# Patient Record
Sex: MALE | Race: BLACK OR AFRICAN AMERICAN | NOT HISPANIC OR LATINO | Employment: FULL TIME | URBAN - METROPOLITAN AREA
[De-identification: names, ages, dates, MRNs, and addresses within clinical notes are randomized per-mention and may not be internally consistent; named-entity substitution may affect disease eponyms.]

---

## 2021-10-20 ENCOUNTER — LAB (OUTPATIENT)
Dept: LAB | Facility: HOSPITAL | Age: 41
End: 2021-10-20

## 2021-10-20 ENCOUNTER — TRANSCRIBE ORDERS (OUTPATIENT)
Dept: LAB | Facility: HOSPITAL | Age: 41
End: 2021-10-20

## 2021-10-20 DIAGNOSIS — K60.3 ANAL FISTULA: Primary | ICD-10-CM

## 2021-10-20 DIAGNOSIS — K60.3 ANAL FISTULA: ICD-10-CM

## 2021-10-20 LAB
DEPRECATED RDW RBC AUTO: 40.4 FL (ref 37–54)
ERYTHROCYTE [DISTWIDTH] IN BLOOD BY AUTOMATED COUNT: 12.6 % (ref 12.3–15.4)
HCT VFR BLD AUTO: 38.1 % (ref 37.5–51)
HGB BLD-MCNC: 12.4 G/DL (ref 13–17.7)
MCH RBC QN AUTO: 28.6 PG (ref 26.6–33)
MCHC RBC AUTO-ENTMCNC: 32.5 G/DL (ref 31.5–35.7)
MCV RBC AUTO: 87.8 FL (ref 79–97)
PLATELET # BLD AUTO: 229 10*3/MM3 (ref 140–450)
PMV BLD AUTO: 10.5 FL (ref 6–12)
RBC # BLD AUTO: 4.34 10*6/MM3 (ref 4.14–5.8)
WBC # BLD AUTO: 5.84 10*3/MM3 (ref 3.4–10.8)

## 2021-10-20 PROCEDURE — 36415 COLL VENOUS BLD VENIPUNCTURE: CPT

## 2021-10-20 PROCEDURE — 80053 COMPREHEN METABOLIC PANEL: CPT

## 2021-10-20 PROCEDURE — 85027 COMPLETE CBC AUTOMATED: CPT

## 2021-10-21 LAB
ALBUMIN SERPL-MCNC: 3.9 G/DL (ref 3.5–5.2)
ALBUMIN/GLOB SERPL: 1 G/DL
ALP SERPL-CCNC: 85 U/L (ref 39–117)
ALT SERPL W P-5'-P-CCNC: 34 U/L (ref 1–41)
ANION GAP SERPL CALCULATED.3IONS-SCNC: 8.9 MMOL/L (ref 5–15)
AST SERPL-CCNC: 32 U/L (ref 1–40)
BILIRUB SERPL-MCNC: 0.5 MG/DL (ref 0–1.2)
BUN SERPL-MCNC: 14 MG/DL (ref 6–20)
BUN/CREAT SERPL: 12.7 (ref 7–25)
CALCIUM SPEC-SCNC: 9.2 MG/DL (ref 8.6–10.5)
CHLORIDE SERPL-SCNC: 102 MMOL/L (ref 98–107)
CO2 SERPL-SCNC: 28.1 MMOL/L (ref 22–29)
CREAT SERPL-MCNC: 1.1 MG/DL (ref 0.76–1.27)
GFR SERPL CREATININE-BSD FRML MDRD: 90 ML/MIN/1.73
GLOBULIN UR ELPH-MCNC: 4.1 GM/DL
GLUCOSE SERPL-MCNC: 80 MG/DL (ref 65–99)
POTASSIUM SERPL-SCNC: 4 MMOL/L (ref 3.5–5.2)
PROT SERPL-MCNC: 8 G/DL (ref 6–8.5)
SODIUM SERPL-SCNC: 139 MMOL/L (ref 136–145)

## 2021-10-26 ENCOUNTER — HOSPITAL ENCOUNTER (OUTPATIENT)
Dept: CARDIOLOGY | Facility: HOSPITAL | Age: 41
Discharge: HOME | End: 2021-10-26
Admitting: COLON & RECTAL SURGERY

## 2021-10-26 ENCOUNTER — TRANSCRIBE ORDERS (OUTPATIENT)
Dept: CARDIOLOGY | Facility: HOSPITAL | Age: 41
End: 2021-10-26

## 2021-10-26 DIAGNOSIS — I10 HYPERTENSION COMPLICATIONS: ICD-10-CM

## 2021-10-26 DIAGNOSIS — I10 HYPERTENSION COMPLICATIONS: Primary | ICD-10-CM

## 2021-10-26 LAB
QT INTERVAL: 408 MS
QTC INTERVAL: 446 MS

## 2021-10-26 PROCEDURE — 93005 ELECTROCARDIOGRAM TRACING: CPT | Performed by: COLON & RECTAL SURGERY

## 2021-10-26 PROCEDURE — 93010 ELECTROCARDIOGRAM REPORT: CPT | Performed by: INTERNAL MEDICINE

## 2021-10-29 ENCOUNTER — TRANSCRIBE ORDERS (OUTPATIENT)
Dept: PHYSICAL THERAPY | Facility: HOSPITAL | Age: 41
End: 2021-10-29

## 2021-10-29 DIAGNOSIS — L02.31 CUTANEOUS ABSCESS OF BUTTOCK: ICD-10-CM

## 2021-10-29 DIAGNOSIS — S31.839D: Primary | ICD-10-CM

## 2021-11-04 ENCOUNTER — HOSPITAL ENCOUNTER (OUTPATIENT)
Dept: PHYSICAL THERAPY | Facility: HOSPITAL | Age: 41
Setting detail: THERAPIES SERIES
Discharge: HOME | End: 2021-11-04

## 2021-11-04 DIAGNOSIS — K61.0 PERIANAL ABSCESS: Primary | ICD-10-CM

## 2021-11-04 PROCEDURE — 97597 DBRDMT OPN WND 1ST 20 CM/<: CPT

## 2021-11-04 PROCEDURE — 97161 PT EVAL LOW COMPLEX 20 MIN: CPT

## 2021-11-04 NOTE — THERAPY EVALUATION
Outpatient Rehabilitation - Wound/Debridement Initial Eval  [facility]     Patient Name: Emiliano Holliday  : 1980  MRN: [id number]  Today's Date: 2021                  Admit Date: 2021    Visit Dx:    ICD-10-CM ICD-9-CM   1. Perianal abscess  K61.0 566       There is no problem list on file for this patient.       No past medical history on file.     No past surgical history on file.     Patient History     Row Name 21 0845             History    Chief Complaint Ulcer, wound or other skin conditions; Pain  -      Type of Pain Other pain  -      Other Type of Pain L buttock wound  -      Brief Description of Current Complaint Pt with h/o recurrent perianal abscess, now s/p surgical debridement of L buttock by Dr. Ramirez.  -      Previous treatment for THIS PROBLEM Surgery  -      Surgery Date: 10/27/21  -      Patient/Caregiver Goals Heal wound; Relieve pain; Return to work; Return to prior level of function  -      Patient's Rating of General Health Good  -      Occupation/sports/leisure activities Pt is a , currently off work due to present condition, spouse has been assisting with dressing changes.  -      How has patient tried to help current problem? Pt has been washing area with soap/water when showering, keeping dry gauze over wound area secured with biker shorts, changing dressings twice/daily  -              Pain     Pain Location Buttocks  -      Pain at Present 5  -      Difficulties at work? currently unable to work due to wound and need for pain medication  -              Services    Are you currently receiving Home Health services No  -              Daily Activities    Primary Language English  -      Patient is concerned about/has problems with Performing job responsibilities/community activities (work, school,; Performing sports, recreation, and play activities; Walking  -      Barriers to learning None  -      Pt  Participated in POC and Goals Yes  -JM            User Key  (r) = Recorded By, (t) = Taken By, (c) = Cosigned By    Initials Name Provider Type    Nicolasa Schulz, EYAD Physical Therapist                EVALUATION   PT Ortho     Row Name 11/04/21 0845       Subjective Comments    Subjective Comments Pt accompanied by spouse, states he took pain medication prior to tx.  -JM       Subjective Pain    Able to rate subjective pain? yes  -JM    Pre-Treatment Pain Level 5  -JM    Post-Treatment Pain Level 5  -JM    Subjective Pain Comment increased pain during intervention  -JM       Transfers    Sit-Stand [place] (Transfers) independent  -JM    Stand-Sit [place] (Transfers) independent  -JM    Comment (Transfers) LSL for tx  -JM       Gait/Stairs (Locomotion)    [place] Level (Gait) independent  -JM    Deviations/Abnormal Patterns (Gait) antalgic; gait speed decreased; base of support, wide  -          User Key  (r) = Recorded By, (t) = Taken By, (c) = Cosigned By    Initials Name Provider Type    Nicolasa Schulz, PT Physical Therapist               LDA Wound     Row Name 11/04/21 0845             Wound 11/04/21 0845 Left perineum Abcess    Wound - Properties Group Placement Date: 11/04/21  - Placement Time: 0845  - Side: Left  - Location: perineum  - Primary Wound Type: Abcess  -      Dressing Appearance intact; moist drainage  4x4 gauze secured with biker shorts  -      Base moist; pink; red; yellow; slough; subcutaneous; necrotic  -JM      Red (%), Wound Tissue Color 50  -JM      Yellow (%), Wound Tissue Color 50  -JM      Periwound intact; indurated; pustules; warm  lateral aspect with weeping pustules in indurated area  -      Periwound Temperature warm  -      Periwound Skin Turgor firm  -      Edges open; irregular  -JM      Wound Length (cm) 5 cm  -JM      Wound Width (cm) 2.5 cm  -JM      Wound Depth (cm) 1 cm  -      Drainage Characteristics/Odor creamy; tan;  "serosanguineous; other (see comments)  culture swab obtained of tan drainage  -      Drainage Amount moderate  -      Care, Wound cleansed with; wound cleanser; debrided  -      Dressing Care dressing applied; antimicrobial agent applied; foam  HFBt, 4x4 gauze, 6\" optifoam gentle  -      Periwound Care barrier ointment applied; cleansed with pH balanced cleanser; dry periwound area maintained  zguard to edges  -      Retired Wound - Properties Group Date first assessed: 11/04/21  - Time first assessed: 0845 - Side: Left  - Location: perineum  - Primary Wound Type: Abcess  -            User Key  (r) = Recorded By, (t) = Taken By, (c) = Cosigned By    Initials Name Provider Type    Nicolasa Schulz, PT Physical Therapist                  WOUND DEBRIDEMENT  Total area of Debridement: 2cmsq  Debridement Site 1  Location- Site 1: L buttock/perianal abscess  Selective Debridement- Site 1: Wound Surface <20cmsq  Instruments- Site 1: tweezers (cotton swabs, 4x4 gauze)  Excised Tissue Description- Site 1: minimum, slough, other (comment)  Bleeding- Site 1: scant              Therapy Education     Row Name 11/04/21 0845             Therapy Education    Education Details Change dressing daily or PRN for drainage or disruption.  Clean wound area with skintegrity spray, wipe gently with gauze.  Apply zguard or abx ointment to skin around wound, cut HFBt to fit wound area, secure with either 6\" optifoam or ABD/gauze and biker shorts to keep HFB in place.  Recommended increased protein intake for wound healing, pressure relief, and rest to assist with wound healing.  PT will contact MD office re: wound culture taken today.  -MO      Given Symptoms/condition management; Bandaging/dressing change; Pain management  -      Program New  -MO      How Provided Verbal; Demonstration  -MO      Provided to Patient; Other (comment)  spouse  -MO      Level of Understanding Verbalized  -            User Key  (r) = " Recorded By, (t) = Taken By, (c) = Cosigned By    Initials Name Provider Type    Nicolasa Schulz PT Physical Therapist                Recommendation and Plan   PT Assessment/Plan     Row Name 11/04/21 0845          PT Assessment    Functional Limitations Impaired gait; Limitation in home management; Limitations in community activities; Performance in leisure activities; Performance in self-care ADL; Performance in work activities; Performance in sport activities  -     Impairments Gait; Integumentary integrity; Pain  -     Assessment Comments Pt presents with open wound to L buttock/perianal area s/p I&D of chronic abscess.  Wound bed moist with approximately 50% granulation/pink tissue and 50% residual necrotic tissue, periwound tissues indurated with some pustules with tan creamy drainage noted.  Pt will require skilled PT for wound debridement and advanced dressings.  PT initiated use of HFBt to assist with autolytic debridement and provide bacteriostatic environment.  Educated pt and spouse on home dressing changes, nutrition for wound healing, and pressure relief to maximize wound healing potential.  -     Rehab Potential Good  -     Patient/caregiver participated in establishment of treatment plan and goals Yes  -     Patient would benefit from skilled therapy intervention Yes  -            PT Plan    PT Frequency 1x/week  -     Predicted Duration of Therapy Intervention (PT) 12 visits  -     Planned CPT's? PT EVAL LOW COMPLEXITY: 69361; PT MCKENZIE DEBRIDE OPEN WOUND UP TO 20 CM: 69020; PT NONSELECT DEBRIDE 15 MIN: 82710; PT SELF CARE/MGMT/TRAIN 15 MIN: 20097  -     Physical Therapy Interventions (Optional Details) patient/family education; wound care  -     PT Plan Comments debridement, dressings  -           User Key  (r) = Recorded By, (t) = Taken By, (c) = Cosigned By    Initials Name Provider Type    Nicolasa Schulz PT Physical Therapist                  Goals   PT OP Goals      Row Name 11/04/21 0845          PT Short Term Goals    STG Date to Achieve 01/02/22  -     STG 1 Reduce wound dimensions by 25% as evidence of wound healing.  -     STG 1 Progress New  -     STG 2 Pt to report less than 3/10 pain with daily activities to promote return to PLOF.  -     STG 2 Progress New  [facility]            Long Term Goals    LTG Date to Achieve 02/02/22  -     LTG 1 Patient/spouse independent with home dressing changes.  -     LTG 1 Progress New  [facility]     LTG 2 Reduce wound dimensions by 75% as evidence of wound healing.  -     LTG 2 Progress New  [facility]            Time Calculation    PT Goal Re-Cert Due Date 02/02/22  -           User Key  (r) = Recorded By, (t) = Taken By, (c) = Cosigned By    Initials Name Provider Type    Nicolasa Schulz, PT Physical Therapist                Time Calculation: Start Time: 0845  Untimed Charges  PT Eval/Re-eval Minutes: 30  Wound Care: 02953 Selective debridement  36833-Gexsqtdjg debridement: 25  Total Minutes  Untimed Charges Total Minutes: 55   Total Minutes: 55  Therapy Charges for Today     Code Description Service Date Service Provider Modifiers Qty    52301890135 HC MCKENZIE DEBRIDE OPEN WOUND UP TO 20CM 11/4/2021 Nicolasa Corrales, PT GP 1    75712304253 HC PT EVAL LOW COMPLEXITY 2 11/4/2021 Nicolasa Corrales, PT GP 1                Nicolasa Corrales, PT  11/4/2021

## 2021-11-10 ENCOUNTER — HOSPITAL ENCOUNTER (OUTPATIENT)
Dept: PHYSICAL THERAPY | Facility: HOSPITAL | Age: 41
Setting detail: THERAPIES SERIES
Discharge: HOME | End: 2021-11-10

## 2021-11-10 DIAGNOSIS — K61.0 PERIANAL ABSCESS: Primary | ICD-10-CM

## 2021-11-10 PROCEDURE — 97602 WOUND(S) CARE NON-SELECTIVE: CPT

## 2021-11-10 NOTE — THERAPY WOUND CARE TREATMENT
"    Outpatient Rehabilitation - Wound/Debridement Treatment Note  [facility]     Patient Name: Emiliano Holliday  : 1980  MRN: [id number]  Today's Date: 11/10/2021                 Admit Date: 11/10/2021    Visit Dx:    ICD-10-CM ICD-9-CM   1. Perianal abscess  K61.0 566       There is no problem list on file for this patient.       No past medical history on file.     No past surgical history on file.      EVALUATION   PT Ortho     Row Name 11/10/21 0850       Subjective Comments    Subjective Comments Pt took pain medication prior to treatment, reports changing the dressing is the hardest part. He ran out of HFB so he had to use gauze this time, which hurts worse.  -MC       Subjective Pain    Able to rate subjective pain? yes  -MC    Pre-Treatment Pain Level 5  -MC    Post-Treatment Pain Level 5  -MC    Subjective Pain Comment \"normal\" level of pain, increased slightly with debridement  -MC       Transfers    Sit-Stand [place] (Transfers) independent  -MC    Stand-Sit [place] (Transfers) independent  -MC    Comment (Transfers) LSL for tx  -       Gait/Stairs (Locomotion)    [place] Level (Gait) independent  -MC    Deviations/Abnormal Patterns (Gait) antalgic; gait speed decreased; base of support, wide  -          User Key  (r) = Recorded By, (t) = Taken By, (c) = Cosigned By    Initials Name Provider Type    Brenda Hernandez, PT Physical Therapist                 [facility] Wound     Row Name 11/10/21 0850             Wound 21 0845 Left perineum Abcess    Wound - Properties Group Placement Date: 21  - Placement Time: 845 Side: Left  - Location: perineum  - Primary Wound Type: Abcess  -JM      Dressing Appearance intact; moist drainage  gauze only  -      Base moist; pink; red; yellow; subcutaneous  increasing beefy red granulation  -MC      Red (%), Wound Tissue Color 80  -MC      Yellow (%), Wound Tissue Color 20  -MC      Periwound intact; indurated; warm  no " "pustules today, very mild induration laterally  -      Periwound Temperature warm  -      Periwound Skin Turgor firm  -      Edges open; irregular  -      Wound Length (cm) 5 cm  -      Wound Width (cm) 2.2 cm  -      Wound Depth (cm) 0.2 cm  -      Drainage Characteristics/Odor serosanguineous  -      Drainage Amount small  -      Care, Wound cleansed with; wound cleanser; debrided  -      Dressing Care dressing applied; antimicrobial agent applied; foam; gauze; low-adherent; border dressing  HFBt, 4x4 gauze. 6\" optifoam  -      Periwound Care cleansed with pH balanced cleanser; barrier ointment applied  zguard  -      Retired Wound - Properties Group Date first assessed: 11/04/21  - Time first assessed: 0845 - Side: Left  - Location: perineum  - Primary Wound Type: Abcess  -            User Key  (r) = Recorded By, (t) = Taken By, (c) = Cosigned By    Initials Name Provider Type    Brenda Hernandez, PT Physical Therapist    Nicolasa Schulz, PT Physical Therapist                  WOUND DEBRIDEMENT  Total area of Debridement: nonselective  Debridement Site 1  Location- Site 1: L buttock/perianal abscess  Selective Debridement- Site 1: Wound Surface <20cmsq  Instruments- Site 1: other (comment) (gauze)  Excised Tissue Description- Site 1: minimum, slough  Bleeding- Site 1: none              Therapy Education     Row Name 11/10/21 0850             Therapy Education    Education Details Continue current POC, no need for repeat culture at this time  -      Given Symptoms/condition management; Bandaging/dressing change; Pain management  -      Program Reinforced  -      How Provided Verbal; Demonstration  -MC      Provided to Patient; Other (comment)  spouse  -      Level of Understanding Verbalized  -            User Key  (r) = Recorded By, (t) = Taken By, (c) = Cosigned By    Initials Name Provider Type    Brenda Hernandez, PT Physical Therapist          "       Recommendation and Plan   PT Assessment/Plan     Row Name 11/10/21 0850          PT Assessment    Functional Limitations Impaired gait; Limitation in home management; Limitations in community activities; Performance in leisure activities; Performance in self-care ADL; Performance in work activities; Performance in sport activities  wound mgmt  -     Impairments Gait; Integumentary integrity; Pain  -     Assessment Comments Pt with stable superficial wound dimensions, but notable decrease in depth, with increased beefy red granulation and only a small amount of yellow slough/subcutaneous tissue remaining. Periwound is also much improved, with no pustules and only mild induration remaining. Pt is making excellent progress with the current POC.  -     Rehab Potential Good  -     Patient/caregiver participated in establishment of treatment plan and goals Yes  -     Patient would benefit from skilled therapy intervention Yes  -            PT Plan    PT Frequency 1x/week  -     Physical Therapy Interventions (Optional Details) wound care; patient/family education  -     PT Plan Comments debridement, dressings, ?decrease frequency if little/no debridement needed  -           User Key  (r) = Recorded By, (t) = Taken By, (c) = Cosigned By    Initials Name Provider Type    Brenda Hernandez, PT Physical Therapist                Goals   PT OP Goals     Row Name 11/10/21 0850          Time Calculation    PT Goal Re-Cert Due Date 02/02/22  -           User Key  (r) = Recorded By, (t) = Taken By, (c) = Cosigned By    Initials Name Provider Type    Brenda Hernandez, PT Physical Therapist                PT Goal Re-Cert Due Date: 02/02/22            Time Calculation: Start Time: 0850  Untimed Charges  Wound Care: 27537 Non-selective debridement  97602-Non-selective debridement: 20  Total Minutes  Untimed Charges Total Minutes: 20   Total Minutes: 20  Therapy Charges for Today     Code Description  Service Date Service Provider Modifiers Qty    71720324285 HC NONSELECTIVE DEBRIDEMENT 11/10/2021 Brenda Alatorre, PT GP 1                  Brenda Alatorre, PT  11/10/2021

## 2021-11-16 ENCOUNTER — HOSPITAL ENCOUNTER (OUTPATIENT)
Dept: PHYSICAL THERAPY | Facility: HOSPITAL | Age: 41
Setting detail: THERAPIES SERIES
Discharge: HOME | End: 2021-11-16

## 2021-11-16 DIAGNOSIS — K61.0 PERIANAL ABSCESS: Primary | ICD-10-CM

## 2021-11-16 PROCEDURE — 97597 DBRDMT OPN WND 1ST 20 CM/<: CPT

## 2021-11-16 NOTE — THERAPY WOUND CARE TREATMENT
Outpatient Rehabilitation - Wound/Debridement Treatment Note  [facility]     Patient Name: Emiliano Holliday  : 1980  MRN: [id number]  Today's Date: 2021                 Admit Date: 2021    Visit Dx:    ICD-10-CM ICD-9-CM   1. Perianal abscess  K61.0 566       There is no problem list on file for this patient.       No past medical history on file.     No past surgical history on file.      EVALUATION   PT Ortho     Row Name 2145       Subjective Comments    Subjective Comments States he has noticed chafing between legs due to increased time spent lying down. Has noticed new bump on R buttock that MD will examine at next appt.  -MP       Subjective Pain    Able to rate subjective pain? yes  -MP    Pre-Treatment Pain Level 5  -MP    Post-Treatment Pain Level 5  -MP       Transfers    Sit-Stand [place] (Transfers) independent  -MP    Stand-Sit [place] (Transfers) independent  -MP    Comment (Transfers) LSL for tx  -MP       Gait/Stairs (Locomotion)    [place] Level (Gait) independent  -MP    Deviations/Abnormal Patterns (Gait) antalgic; gait speed decreased; base of support, wide  -MP          User Key  (r) = Recorded By, (t) = Taken By, (c) = Cosigned By    Initials Name Provider Type    Antoni Mahan PT Physical Therapist                 [facility] Wound     Row Name 2145             Wound 2145 Left perineum Abcess    Wound - Properties Group Placement Date: 21  - Placement Time: 845  - Side: Left  - Location: perineum  - Primary Wound Type: Abcess  -      Dressing Appearance intact; moist drainage  -MP      Base moist; pink; red; yellow; subcutaneous  increasing beefy red granulation  -MP      Periwound intact; indurated; warm  mild induration laterally  -MP      Periwound Temperature warm  -MP      Periwound Skin Turgor firm  -MP      Edges open; irregular  -MP      Wound Length (cm) 4.7 cm  -MP      Wound Width (cm) 2.4 cm  -MP       Wound Depth (cm) 0.2 cm  -MP      Drainage Characteristics/Odor serosanguineous  -MP      Drainage Amount small  -MP      Care, Wound cleansed with; wound cleanser; debrided  -MP      Dressing Care dressing applied; antimicrobial agent applied; foam; gauze; border dressing  HFBt, 4x4 gauze, 6'' optifoam  -MP      Periwound Care cleansed with pH balanced cleanser; barrier ointment applied; other (see comments)  z guard  -MP      Retired Wound - Properties Group Date first assessed: 11/04/21  - Time first assessed: 0845 - Side: Left  - Location: perineum  - Primary Wound Type: Abcess  -            User Key  (r) = Recorded By, (t) = Taken By, (c) = Cosigned By    Initials Name Provider Type    Nicolasa Schulz, PT Physical Therapist    Antoni Mahan, PT Physical Therapist                  WOUND DEBRIDEMENT  Total area of Debridement: 1 cm2  Debridement Site 1  Location- Site 1: L buttock/perianal abscess  Selective Debridement- Site 1: Wound Surface <20cmsq  Instruments- Site 1: tweezers, other (comment) (gauze)  Excised Tissue Description- Site 1: minimum, slough  Bleeding- Site 1: none              Therapy Education     Row Name 11/16/21 1504             Therapy Education    Education Details PT issued interdry to place between skin folds to assist with moisture management.  -MP      Given Symptoms/condition management; Bandaging/dressing change; Pain management  -MP      Program Reinforced  -MP      How Provided Verbal; Demonstration  -MP      Provided to Patient; Other (comment)  spouse  -MP      Level of Understanding Verbalized  -MP            User Key  (r) = Recorded By, (t) = Taken By, (c) = Cosigned By    Initials Name Provider Type    Antoni Mahan PT Physical Therapist                Recommendation and Plan   PT Assessment/Plan     Row Name 11/16/21 4693          PT Assessment    Functional Limitations Impaired gait; Limitation in home management; Limitations in community activities;  Performance in leisure activities; Performance in self-care ADL; Performance in work activities; Performance in sport activities  wound mgmt  -MP     Impairments Gait; Integumentary integrity; Pain  -MP     Assessment Comments Wound length measured smaller but width measured slightly larger likely due to interrater difference as opposed to actual worsening. PT able to lightly debride minimal slough from wound base revealing moist, healthy red tissue. Mild induration present to lateral wound border. Patient would continue to benefit from skilled PT wound care to promote increased wound healing potential.  -MP     Rehab Potential Good  -MP     Patient/caregiver participated in establishment of treatment plan and goals Yes  -MP     Patient would benefit from skilled therapy intervention Yes  -MP            PT Plan    PT Frequency 1x/week  -MP     Physical Therapy Interventions (Optional Details) patient/family education; wound care  -MP     PT Plan Comments debridement, dressing management, decrease appt frequency pending debridement needs  -MP           User Key  (r) = Recorded By, (t) = Taken By, (c) = Cosigned By    Initials Name Provider Type    Antoni Mahan PT Physical Therapist                Goals   PT OP Goals     Row Name 11/16/21 1015          Time Calculation    PT Goal Re-Cert Due Date 02/02/22  -MP           User Key  (r) = Recorded By, (t) = Taken By, (c) = Cosigned By    Initials Name Provider Type    Antoni Mahan PT Physical Therapist                PT Goal Re-Cert Due Date: 02/02/22            Time Calculation: Start Time: 0945  Untimed Charges  46227-Uvxarpxmd debridement: 20  Total Minutes  Untimed Charges Total Minutes: 20   Total Minutes: 20  Therapy Charges for Today     Code Description Service Date Service Provider Modifiers Qty    [id number]  MCKENZIE DEBRIDE OPEN WOUND UP TO 20CM 11/16/2021 Antoni Varela PT GP 1                  Antoni Varela PT  11/16/2021

## 2021-11-30 ENCOUNTER — HOSPITAL ENCOUNTER (OUTPATIENT)
Dept: PHYSICAL THERAPY | Facility: HOSPITAL | Age: 41
Setting detail: THERAPIES SERIES
Discharge: HOME | End: 2021-11-30

## 2021-11-30 DIAGNOSIS — K61.0 PERIANAL ABSCESS: Primary | ICD-10-CM

## 2021-11-30 PROCEDURE — 97597 DBRDMT OPN WND 1ST 20 CM/<: CPT

## 2021-12-09 ENCOUNTER — HOSPITAL ENCOUNTER (OUTPATIENT)
Dept: PHYSICAL THERAPY | Facility: HOSPITAL | Age: 41
Setting detail: THERAPIES SERIES
Discharge: HOME | End: 2021-12-09

## 2021-12-09 DIAGNOSIS — K61.0 PERIANAL ABSCESS: Primary | ICD-10-CM

## 2021-12-09 PROCEDURE — 97597 DBRDMT OPN WND 1ST 20 CM/<: CPT

## 2021-12-09 NOTE — THERAPY PROGRESS REPORT/RE-CERT
Outpatient Rehabilitation - Wound/Debridement Progress Note  [facility]     Patient Name: Emiliano Holliday  : 1980  MRN: [id number]  Today's Date: 2021                  Admit Date: 2021    Visit Dx:    ICD-10-CM ICD-9-CM   1. Perianal abscess  K61.0 566       There is no problem list on file for this patient.       No past medical history on file.     No past surgical history on file.      EVALUATION   PT Ortho     Row Name 21       Subjective Comments    Subjective Comments States chafing is cont to improve. States wound itself rarely hurts but distal area is painful.  -MP       Subjective Pain    Able to rate subjective pain? yes  -MP    Pre-Treatment Pain Level 2  -MP    Post-Treatment Pain Level 2  -MP       Transfers    Sit-Stand [place] (Transfers) independent  -MP    Stand-Sit [place] (Transfers) independent  -MP    Comment (Transfers) LSL for tx  -MP       Gait/Stairs (Locomotion)    [place] Level (Gait) independent  -MP    Deviations/Abnormal Patterns (Gait) antalgic; gait speed decreased; base of support, wide  -MP          User Key  (r) = Recorded By, (t) = Taken By, (c) = Cosigned By    Initials Name Provider Type    MP Antoni Varela, PT Physical Therapist                 [facility] Wound     Row Name 2135             Wound 21 0845 Left perineum Abcess    Wound - Properties Group Placement Date: 21  - Placement Time: 845 Side: Left  - Location: perineum  - Primary Wound Type: Abcess  -      Wound Image  View All Images View Images  -MP      Dressing Appearance intact; moist drainage  -MP      Base moist; pink; red; yellow; epithelialization  granulated, additional reepithelialization  -MP      Periwound intact; indurated; warm; pustules  min induration, small pustules of indurated area  -MP      Periwound Temperature warm  -MP      Periwound Skin Turgor firm  -MP      Edges open; irregular  -MP      Wound Length (cm) 3.5 cm  -MP       Wound Width (cm) 1 cm  -MP      Wound Depth (cm) 0.2 cm  -MP      Drainage Characteristics/Odor serosanguineous; yellow; creamy; other (see comments)  yellow exudate expressed from R buttock with gentle overpressure  -MP      Drainage Amount small  -MP      Care, Wound cleansed with; wound cleanser; debrided  -MP      Dressing Care dressing applied; antimicrobial agent applied; gauze  HFBt, 4x4 gauze, primafix tape  -MP      Periwound Care cleansed with pH balanced cleanser; barrier ointment applied; other (see comments)  z guard/antifungal  -MP      Retired Wound - Properties Group Date first assessed: 11/04/21  - Time first assessed: 0845 - Side: Left  - Location: perineum  - Primary Wound Type: Abcess  -            User Key  (r) = Recorded By, (t) = Taken By, (c) = Cosigned By    Initials Name Provider Type    Nicolasa Schulz, PT Physical Therapist    Antoni Mahan, PT Physical Therapist                  WOUND DEBRIDEMENT  Total area of Debridement: 1 cm2  Debridement Site 1  Location- Site 1: L buttock/perianal abscess  Selective Debridement- Site 1: Wound Surface <20cmsq  Instruments- Site 1: tweezers, other (comment) (gauze)  Excised Tissue Description- Site 1: minimum, slough  Bleeding- Site 1: none              Therapy Education     Row Name 12/09/21 0835             Therapy Education    Given Symptoms/condition management; Bandaging/dressing change; Pain management  -MP      Program Reinforced  -MP      How Provided Verbal; Demonstration  -MP      Provided to Patient; Other (comment)  spouse  -MP      Level of Understanding Verbalized  -MP            User Key  (r) = Recorded By, (t) = Taken By, (c) = Cosigned By    Initials Name Provider Type    Antoni Mahan, PT Physical Therapist                Recommendation and Plan   PT Assessment/Plan     Row Name 12/09/21 0935          PT Assessment    Functional Limitations Impaired gait; Limitation in home management; Limitations in  community activities; Performance in leisure activities; Performance in self-care ADL; Performance in work activities; Performance in sport activities  wound mgmt  -MP     Impairments Gait; Integumentary integrity; Pain  -MP     Assessment Comments L buttock wound with notable decrease in measurement with new epithelial tissue at wound periphery. One small pustule noted to proximal periwound skin with small open pinpoint area to R buttock that PT was able to express yellow exudate from with light overpressure. PT progress note completed this date with pt meeting two goals and making progress towards meeting remainder of wound care goals. Patient would continue to benefit from skilled PT wound care to promote increased wound healing potential.  -MP     Rehab Potential Good  -MP     Patient/caregiver participated in establishment of treatment plan and goals Yes  -MP     Patient would benefit from skilled therapy intervention Yes  -MP            PT Plan    PT Frequency 1x/week  -MP     Predicted Duration of Therapy Intervention (PT) 10 visits  -MP     Planned CPT's? PT SELF CARE/MGMT/TRAIN 15 MIN: 64434; PT NONSELECT DEBRIDE 15 MIN: 03290; PT MCKENZIE DEBRIDE OPEN WOUND UP TO 20 CM: 50572; PT NLFU MIST: [id number]  -MP     Physical Therapy Interventions (Optional Details) patient/family education; wound care  -MP     PT Plan Comments debridement, dressing management  -MP           User Key  (r) = Recorded By, (t) = Taken By, (c) = Cosigned By    Initials Name Provider Type    Antoni Mahan, PT Physical Therapist                Goals   PT OP Goals     Row Name 12/09/21 1008 12/09/21 0835       PT Short Term Goals    STG Date to Achieve -- 01/02/22  -MP    STG 1 -- Reduce wound dimensions by 25% as evidence of wound healing.  -MP    STG 1 Progress -- Met  -MP    STG 2 -- Pt to report less than 3/10 pain with daily activities to promote return to PLOF.  -MP    STG 2 Progress -- Ongoing  -MP       Long Term Goals    LTG Date to  Achieve -- 02/02/22  -    LTG 1 -- Patient/spouse independent with home dressing changes.  -MP    LTG 1 Progress -- Met  -    LTG 2 -- Reduce wound dimensions by 75% as evidence of wound healing.  -    LTG 2 Progress -- Ongoing  -       Time Calculation    PT Goal Re-Cert Due Date 02/02/22  -MP 02/02/22  -MP          User Key  (r) = Recorded By, (t) = Taken By, (c) = Cosigned By    Initials Name Provider Type    Antoni Mahan, PT Physical Therapist                PT Goal Re-Cert Due Date: 02/02/22  PT Short Term Goals  STG Date to Achieve: 01/02/22  STG 1: Reduce wound dimensions by 25% as evidence of wound healing.  STG 1 Progress: Met  STG 2: Pt to report less than 3/10 pain with daily activities to promote return to PLOF.  STG 2 Progress: Ongoing  Long Term Goals  LTG Date to Achieve: 02/02/22  LTG 1: Patient/spouse independent with home dressing changes.  LTG 1 Progress: Met  LTG 2: Reduce wound dimensions by 75% as evidence of wound healing.  LTG 2 Progress: Ongoing      Time Calculation: Start Time: 0935  Untimed Charges  86421-Nzhhjoxcy debridement: 20  Total Minutes  Untimed Charges Total Minutes: 20   Total Minutes: 20  Therapy Charges for Today     Code Description Service Date Service Provider Modifiers Qty    [id number] HC MCKENZIE DEBRIDE OPEN WOUND UP TO 20CM 12/9/2021 Antoni Varela, PT GP 1                  Antoni Varela PT  12/9/2021

## 2021-12-15 ENCOUNTER — HOSPITAL ENCOUNTER (OUTPATIENT)
Dept: PHYSICAL THERAPY | Facility: HOSPITAL | Age: 41
Setting detail: THERAPIES SERIES
Discharge: HOME | End: 2021-12-15

## 2021-12-15 DIAGNOSIS — K61.0 PERIANAL ABSCESS: Primary | ICD-10-CM

## 2021-12-15 PROCEDURE — 97597 DBRDMT OPN WND 1ST 20 CM/<: CPT

## 2021-12-15 NOTE — THERAPY WOUND CARE TREATMENT
"    Outpatient Rehabilitation - Wound/Debridement Treatment Note  [facility]     Patient Name: Emiliano Holliday  : 1980  MRN: [id number]  Today's Date: 12/15/2021                 Admit Date: 12/15/2021    Visit Dx:    ICD-10-CM ICD-9-CM   1. Perianal abscess  K61.0 566       There is no problem list on file for this patient.       No past medical history on file.     No past surgical history on file.      EVALUATION   PT Ortho     Row Name 12/15/21 0945       Subjective Comments    Subjective Comments States he has noticed more pain in \"tailbone\" and drainage from wound base. Worried that he may have to have additional surgery to area. Will follow up with dermatology in January.  -MP       Subjective Pain    Able to rate subjective pain? yes  -MP    Pre-Treatment Pain Level 3  -MP    Post-Treatment Pain Level 3  -MP       Transfers    Sit-Stand [place] (Transfers) independent  -MP    Stand-Sit [place] (Transfers) independent  -MP    Comment (Transfers) LSL for tx  -MP       Gait/Stairs (Locomotion)    [place] Level (Gait) independent  -MP    Deviations/Abnormal Patterns (Gait) antalgic; gait speed decreased; base of support, wide  -MP          User Key  (r) = Recorded By, (t) = Taken By, (c) = Cosigned By    Initials Name Provider Type    Antoni Mahan PT Physical Therapist                 [facility] Wound     Row Name 12/15/21 0945             Wound 21 0845 Left perineum Abcess    Wound - Properties Group Placement Date: 21  - Placement Time: 845  - Side: Left  - Location: perineum  - Primary Wound Type: Abcess  -JM      Dressing Appearance intact; moist drainage  -MP      Base moist; pink; red; yellow; epithelialization  granulated, additional reepithelialization  -MP      Periwound intact; indurated; warm; pustules  min induration, small pustules of indurated area  -MP      Periwound Temperature warm  -MP      Periwound Skin Turgor firm  -MP      Edges open; irregular  " -MP      Drainage Characteristics/Odor serosanguineous; yellow; creamy; other (see comments)  -MP      Drainage Amount small  -MP      Care, Wound cleansed with; wound cleanser; debrided  -MP      Dressing Care dressing applied; antimicrobial agent applied; foam; gauze  HFBt, 4x4 primafix tape  -MP      Periwound Care cleansed with pH balanced cleanser; barrier ointment applied; other (see comments)  z guard/antifungal  -MP      Retired Wound - Properties Group Date first assessed: 11/04/21  - Time first assessed: 0845 - Side: Left  - Location: perineum  - Primary Wound Type: Abcess  -            User Key  (r) = Recorded By, (t) = Taken By, (c) = Cosigned By    Initials Name Provider Type    Nicolasa Schulz, PT Physical Therapist    Antoni Mahan, PT Physical Therapist                  WOUND DEBRIDEMENT  Total area of Debridement: 1 cm2  Debridement Site 1  Location- Site 1: L buttock/perianal abscess  Selective Debridement- Site 1: Wound Surface <20cmsq  Instruments- Site 1: tweezers, other (comment) (gauze)  Excised Tissue Description- Site 1: minimum, slough  Bleeding- Site 1: none              Therapy Education     Row Name 12/15/21 5441             Therapy Education    Education Details Encouraged pt to follow up with surgeon if wound pain and drainage continues to worsen.  -MP      Given Symptoms/condition management; Bandaging/dressing change; Pain management  -MP      Program Reinforced  -MP      How Provided Verbal; Demonstration  -MP      Provided to Patient; Other (comment)  spouse  -MP      Level of Understanding Verbalized  -MP            User Key  (r) = Recorded By, (t) = Taken By, (c) = Cosigned By    Initials Name Provider Type    Antoni Mahan, PT Physical Therapist                Recommendation and Plan   PT Assessment/Plan     Row Name 12/15/21 2379          PT Assessment    Functional Limitations Impaired gait; Limitation in home management; Limitations in community  activities; Performance in leisure activities; Performance in self-care ADL; Performance in work activities; Performance in sport activities  wound mgmt  -MP     Impairments Gait; Integumentary integrity; Pain  -MP     Assessment Comments PT able to lightly debride slough from wound base revealing moist pink wound base with epithelial tissue noted to wound periphery. Periwound induration and pustules remain stable but pt reports increased pain and drainage from area. Pt to see a dermatologist in January but PT instructed pt to follow up with surgeon if symptoms worsen. Patient would continue to benefit from skilled PT wound care to promote increased wound healing potential.  -MP            PT Plan    PT Frequency 1x/week  -MP     Physical Therapy Interventions (Optional Details) patient/family education; wound care  -MP     PT Plan Comments debridement, dressing management  -MP           User Key  (r) = Recorded By, (t) = Taken By, (c) = Cosigned By    Initials Name Provider Type    Antoni Mahan PT Physical Therapist                Goals   PT OP Goals     Row Name 12/15/21 1041          Time Calculation    PT Goal Re-Cert Due Date 02/02/22  -MP           User Key  (r) = Recorded By, (t) = Taken By, (c) = Cosigned By    Initials Name Provider Type    Antoni Mahan, PT Physical Therapist                PT Goal Re-Cert Due Date: 02/02/22            Time Calculation: Start Time: 0945  Untimed Charges  43754-Ymaboufhl debridement: 20  Total Minutes  Untimed Charges Total Minutes: 20   Total Minutes: 20  Therapy Charges for Today     Code Description Service Date Service Provider Modifiers Qty    [id number] HC MCKENZIE DEBRIDE OPEN WOUND UP TO 20CM 12/15/2021 Antoni Varela, PT GP 1                  Antoni Varela PT  12/15/2021

## 2021-12-21 ENCOUNTER — HOSPITAL ENCOUNTER (OUTPATIENT)
Dept: PHYSICAL THERAPY | Facility: HOSPITAL | Age: 41
Setting detail: THERAPIES SERIES
Discharge: HOME | End: 2021-12-21

## 2021-12-21 DIAGNOSIS — K61.0 PERIANAL ABSCESS: Primary | ICD-10-CM

## 2021-12-21 PROCEDURE — 97597 DBRDMT OPN WND 1ST 20 CM/<: CPT

## 2021-12-21 NOTE — THERAPY WOUND CARE TREATMENT
"    Outpatient Rehabilitation - Wound/Debridement Treatment Note  [facility]     Patient Name: Emiliano Holliday  : 1980  MRN: [id number]  Today's Date: 2021                 Admit Date: 2021    Visit Dx:    ICD-10-CM ICD-9-CM   1. Perianal abscess  K61.0 566       There is no problem list on file for this patient.       No past medical history on file.     No past surgical history on file.      EVALUATION   PT Ortho     Row Name 21 08       Subjective Comments    Subjective Comments Pt reports mostly discomfort now instead of pain. The proximal/lateral area still has a pinpoint \"hole\" that drains.  -       Subjective Pain    Able to rate subjective pain? yes  -MC    Pre-Treatment Pain Level 0  -MC    Post-Treatment Pain Level 0  -MC       Transfers    Sit-Stand [place] (Transfers) independent  -MC    Stand-Sit [place] (Transfers) independent  -MC    Comment (Transfers) LSL for tx  -       Gait/Stairs (Locomotion)    [place] Level (Gait) independent  -          User Key  (r) = Recorded By, (t) = Taken By, (c) = Cosigned By    Initials Name Provider Type     Brenda Alatorre, PT Physical Therapist                 [facility] Wound     Row Name 21             Wound 21 0845 Left perineum Abcess    Wound - Properties Group Placement Date: 21  - Placement Time: 845 Side: Left  - Location: perineum  - Primary Wound Type: Abcess  -      Wound Image  View All Images View Images  -      Dressing Appearance intact; moist drainage  -      Base moist; pink; red; epithelialization  one small area just anterior to rectum remains open  -      Periwound intact; indurated; warm; pustules  min induration, pinpoint area near induration that is draining  -      Periwound Temperature warm  -      Periwound Skin Turgor firm  -      Edges open; irregular  -      Wound Length (cm) 1.9 cm  -      Wound Width (cm) 1 cm  with manual separation of " edges  -      Wound Depth (cm) 0.5 cm  -      Drainage Characteristics/Odor serosanguineous; yellow; creamy; other (see comments)  creamy from proximal/lateral pinpoint area only  -      Drainage Amount small  -      Care, Wound cleansed with; wound cleanser; debrided  -      Dressing Care dressing applied; silver impregnated; collagen; other (see comments)  kyle only to perirectal area, gauze/primafix to gluteal area  -      Periwound Care cleansed with pH balanced cleanser; dry periwound area maintained  -      Retired Wound - Properties Group Date first assessed: 11/04/21  - Time first assessed: 0845 - Side: Left  - Location: perineum  - Primary Wound Type: Abcess  -            User Key  (r) = Recorded By, (t) = Taken By, (c) = Cosigned By    Initials Name Provider Type    Brenda Hernandez, PT Physical Therapist    Nicolasa Schulz, PT Physical Therapist                  WOUND DEBRIDEMENT  Total area of Debridement: 1 cm2  Debridement Site 1  Location- Site 1: L buttock/perianal abscess  Selective Debridement- Site 1: Wound Surface <20cmsq  Instruments- Site 1: tweezers  Excised Tissue Description- Site 1: minimum, slough  Bleeding- Site 1: none              Therapy Education     Row Name 12/21/21 0800             Therapy Education    Education Details Follow up with surgeon if proximal/gluteal area worsens or s/sx of infection return. Use kyle only to pack the remaining perirectal area - it should stay in place without cover dressing.  -MC      Given Symptoms/condition management; Bandaging/dressing change; Pain management  -      Program Reinforced  -MC      How Provided Verbal; Demonstration  -MC      Provided to Patient  -MC      Level of Understanding Verbalized  -            User Key  (r) = Recorded By, (t) = Taken By, (c) = Cosigned By    Initials Name Provider Type    Brenda Hernandez, PT Physical Therapist                Recommendation and Plan   PT  "Assessment/Plan     Row Name 12/21/21 0800          PT Assessment    Functional Limitations Impaired gait; Limitation in home management; Limitations in community activities; Performance in leisure activities; Performance in self-care ADL; Performance in work activities; Performance in sport activities  wound mgmt  -     Impairments Gait; Integumentary integrity; Pain  -     Assessment Comments The primary wound area is mostly epithelialized today, with a small \"fold\" of open area remaining just anterior to the rectum. PT able to pack this area with kyle and, given the approximation of the wound walls, no cover dressing is needed. Of note, pt still with mixed creamy and serosanguinous drainage to the proximal gluteal pinpoint area, with nearby induration. This is concerning for additional abscessed tissue. If drainage continues or worsens, pt is agreeable to follow up with his surgeon sooner than planned. Pt will continue to benefit from skilled PT wound care to promote healing.  -     Rehab Potential Good  -     Patient/caregiver participated in establishment of treatment plan and goals Yes  -     Patient would benefit from skilled therapy intervention Yes  -            PT Plan    PT Frequency 1x/week  -     Physical Therapy Interventions (Optional Details) wound care; patient/family education  -     PT Plan Comments debridement, dressings management  -           User Key  (r) = Recorded By, (t) = Taken By, (c) = Cosigned By    Initials Name Provider Type    Brenda Hernandez, PT Physical Therapist                Goals   PT OP Goals     Row Name 12/21/21 0800          Time Calculation    PT Goal Re-Cert Due Date 02/02/22  -           User Key  (r) = Recorded By, (t) = Taken By, (c) = Cosigned By    Initials Name Provider Type    Brenda Hernandez PT Physical Therapist                PT Goal Re-Cert Due Date: 02/02/22            Time Calculation: Start Time: 0800  Untimed " Charges  62713-Qnnvqxdbz debridement: 15  Total Minutes  Untimed Charges Total Minutes: 15   Total Minutes: 15  Therapy Charges for Today     Code Description Service Date Service Provider Modifiers Qty    [id number]  MCKENZIE DEBRIDE OPEN WOUND UP TO 20CM 12/21/2021 Brenda Alatorre, PT GP 1                  Brenda Alatorre, PT  12/21/2021

## 2021-12-29 ENCOUNTER — HOSPITAL ENCOUNTER (OUTPATIENT)
Dept: PHYSICAL THERAPY | Facility: HOSPITAL | Age: 41
Setting detail: THERAPIES SERIES
Discharge: HOME | End: 2021-12-29

## 2021-12-29 DIAGNOSIS — K61.0 PERIANAL ABSCESS: Primary | ICD-10-CM

## 2021-12-29 PROCEDURE — 97597 DBRDMT OPN WND 1ST 20 CM/<: CPT

## 2021-12-29 NOTE — THERAPY WOUND CARE TREATMENT
"    Outpatient Rehabilitation - Wound/Debridement Treatment Note  [facility]     Patient Name: Emiliano Holliday  : 1980  MRN: [id number]  Today's Date: 2021                 Admit Date: 2021    Visit Dx:    ICD-10-CM ICD-9-CM   1. Perianal abscess  K61.0 566       There is no problem list on file for this patient.       No past medical history on file.     No past surgical history on file.      EVALUATION   PT Ortho     Row Name 21       Subjective Comments    Subjective Comments States he sees a dermatologist at end of January. Home dressing management is still going okay.  -MP       Subjective Pain    Able to rate subjective pain? yes  -MP    Pre-Treatment Pain Level 0  -MP    Post-Treatment Pain Level 0  -MP    Subjective Pain Comment just \"annoying\"  -MP       Transfers    Sit-Stand [place] (Transfers) independent  -MP    Stand-Sit [place] (Transfers) independent  -MP    Comment (Transfers) LSL for tx  -MP       Gait/Stairs (Locomotion)    [place] Level (Gait) independent  -MP          User Key  (r) = Recorded By, (t) = Taken By, (c) = Cosigned By    Initials Name Provider Type    Antoni Mahan, PT Physical Therapist                 [facility] Wound     Row Name 2140             Wound 21 0845 Left perineum Abcess    Wound - Properties Group Placement Date: 21  - Placement Time: 845 Side: Left  - Location: perineum  - Primary Wound Type: Abcess  -JM      Dressing Appearance intact; moist drainage  -MP      Base moist; pink; red; epithelialization  one small area just anterior to rectum remains open  -MP      Periwound intact; indurated; warm; pustules  min induration, pinpoint area near induration that is draining  -MP      Periwound Temperature warm  -MP      Periwound Skin Turgor firm  -MP      Edges open; irregular  -MP      Drainage Characteristics/Odor serosanguineous; yellow; creamy; other (see comments)  creamy from proximal/lateral " pinpoint area only  -MP      Drainage Amount small  -MP      Care, Wound cleansed with; wound cleanser; debrided  -MP      Dressing Care dressing applied; collagen; gauze  kyle to perirectal area, gauze/primafix tape to gluteal pustule  -MP      Periwound Care cleansed with pH balanced cleanser; dry periwound area maintained  -MP      Retired Wound - Properties Group Date first assessed: 11/04/21  - Time first assessed: 0845 - Side: Left  - Location: perineum  - Primary Wound Type: Abcess  -            User Key  (r) = Recorded By, (t) = Taken By, (c) = Cosigned By    Initials Name Provider Type    Nicolasa Schulz, PT Physical Therapist    Antoni Mahan, PT Physical Therapist                  WOUND DEBRIDEMENT  Total area of Debridement: 1 cm2  Debridement Site 1  Location- Site 1: L buttock/perianal abscess  Selective Debridement- Site 1: Wound Surface <20cmsq  Instruments- Site 1: tweezers  Excised Tissue Description- Site 1: minimum, slough  Bleeding- Site 1: none              Therapy Education     Row Name 12/29/21 0975             Therapy Education    Education Details Continue current POC. PT reinforced need to follow up with surgeon if gluteal wound worsens, or if s/sx of infection present.  -MP      Given Symptoms/condition management; Bandaging/dressing change; Pain management  -MP      Program Reinforced  -MP      How Provided Verbal; Demonstration  -MP      Provided to Patient  -MP      Level of Understanding Verbalized  -MP            User Key  (r) = Recorded By, (t) = Taken By, (c) = Cosigned By    Initials Name Provider Type    Antoni Mahan, PT Physical Therapist                Recommendation and Plan   PT Assessment/Plan     Row Name 12/29/21 0927          PT Assessment    Functional Limitations Impaired gait; Limitation in home management; Limitations in community activities; Performance in leisure activities; Performance in self-care ADL; Performance in work activities;  Performance in sport activities  wound mgmt  -MP     Impairments Gait; Integumentary integrity; Pain  -MP     Assessment Comments Primary wound continues to reepithelialize with pink, fragile skin with only small moist wound base remaining. Proximal gluteal pinpoint open area remains stable with creamy drainage and nearby induration present. PT reinforced need for follow up with surgeon if status worsens. Patient would continue to benefit from skilled PT wound care to promote increased wound healing potential.  -MP     Rehab Potential Good  -MP     Patient/caregiver participated in establishment of treatment plan and goals Yes  -MP     Patient would benefit from skilled therapy intervention Yes  -MP            PT Plan    PT Frequency 1x/week  -MP     Physical Therapy Interventions (Optional Details) patient/family education; wound care  -MP     PT Plan Comments debridement, dressing management  -MP           User Key  (r) = Recorded By, (t) = Taken By, (c) = Cosigned By    Initials Name Provider Type    Antoni Mahan PT Physical Therapist                Goals   PT OP Goals     Row Name 12/29/21 1006          Time Calculation    PT Goal Re-Cert Due Date 02/02/22  -MP           User Key  (r) = Recorded By, (t) = Taken By, (c) = Cosigned By    Initials Name Provider Type    Antoni Mahan PT Physical Therapist                PT Goal Re-Cert Due Date: 02/02/22            Time Calculation: Start Time: 0940  Untimed Charges  94796-Lepeyqusb debridement: 10  Total Minutes  Untimed Charges Total Minutes: 10   Total Minutes: 10  Therapy Charges for Today     Code Description Service Date Service Provider Modifiers Qty    [id number] HC MCKENZIE DEBRIDE OPEN WOUND UP TO 20CM 12/29/2021 Antoni Varela, PT GP 1                  Antoni Varela PT  12/29/2021

## 2022-01-05 ENCOUNTER — HOSPITAL ENCOUNTER (OUTPATIENT)
Dept: PHYSICAL THERAPY | Facility: HOSPITAL | Age: 42
Setting detail: THERAPIES SERIES
Discharge: HOME | End: 2022-01-05

## 2022-01-05 DIAGNOSIS — K61.0 PERIANAL ABSCESS: Primary | ICD-10-CM

## 2022-01-05 PROCEDURE — 97597 DBRDMT OPN WND 1ST 20 CM/<: CPT

## 2022-01-05 NOTE — THERAPY PROGRESS REPORT/RE-CERT
Outpatient Rehabilitation - Wound/Debridement Progress Note  [facility]     Patient Name: Emiliano Holliday  : 1980  MRN: [id number]  Today's Date: 2022                 Admit Date: 2022    Visit Dx:    ICD-10-CM ICD-9-CM   1. Perianal abscess  K61.0 566       There is no problem list on file for this patient.       No past medical history on file.     No past surgical history on file.      EVALUATION   PT Ortho     Row Name 22 08       Subjective Comments    Subjective Comments No changes, reports he is calling and trying to get into the dermatologist earlier than late this month. Still draining from the gluteal pinpoint area  -       Subjective Pain    Able to rate subjective pain? yes  -MC    Pre-Treatment Pain Level 0  -MC    Post-Treatment Pain Level 0  -MC       Transfers    Sit-Stand [place] (Transfers) independent  -MC    Stand-Sit [place] (Transfers) independent  -MC    Comment (Transfers) LSL for tx  -MC       Gait/Stairs (Locomotion)    [place] Level (Gait) independent  -MC          User Key  (r) = Recorded By, (t) = Taken By, (c) = Cosigned By    Initials Name Provider Type    Brenda Hernandez, PT Physical Therapist                 [facility] Wound     Row Name 22             Wound 21 0845 Left perineum Abcess    Wound - Properties Group Placement Date: 21  - Placement Time: 845 Side: Left  - Location: perineum  - Primary Wound Type: Abcess  -      Dressing Appearance intact; moist drainage  -      Base moist; pink; red; epithelialization  small area just anterior to rectum remains open  -      Periwound intact; indurated; warm; pustules  min induration, pinpoint area near induration that is draining  -      Periwound Temperature warm  -      Periwound Skin Turgor firm  -      Edges open; irregular  -      Wound Length (cm) 1.9 cm  -MC      Wound Width (cm) 1 cm  -      Drainage Characteristics/Odor  "serosanguineous; yellow; creamy; other (see comments)  creamy from proximal/lateral pinpoint area only  -      Drainage Amount small  -      Care, Wound cleansed with; wound cleanser; debrided  -      Dressing Care dressing applied; silver impregnated; collagen; other (see comments)  kyle to perirectal area, 4\" optifoam/primafix gluteal  -      Periwound Care cleansed with pH balanced cleanser; dry periwound area maintained  -      Retired Wound - Properties Group Date first assessed: 11/04/21  - Time first assessed: 0845 - Side: Left  - Location: perineum  - Primary Wound Type: Abcess  -            User Key  (r) = Recorded By, (t) = Taken By, (c) = Cosigned By    Initials Name Provider Type    Brenda Hernandez, PT Physical Therapist    Nicolasa Schulz, PT Physical Therapist                  WOUND DEBRIDEMENT  Total area of Debridement: 1 cm2  Debridement Site 1  Location- Site 1: L buttock/perianal abscess  Selective Debridement- Site 1: Wound Surface <20cmsq  Instruments- Site 1: tweezers  Excised Tissue Description- Site 1: minimum, slough  Bleeding- Site 1: none              Therapy Education     Row Name 01/05/22 0800             Therapy Education    Education Details Continue with current POC. Keep calling to try to get in with dermatologist earlier if possible. Consider follow-up with Dr. Ramirez for buttock abscess.  -MC      Given Symptoms/condition management; Bandaging/dressing change; Pain management  -MC      Program Reinforced  -MC      How Provided Verbal; Demonstration  -MC      Provided to Patient  -MC      Level of Understanding Verbalized  -            User Key  (r) = Recorded By, (t) = Taken By, (c) = Cosigned By    Initials Name Provider Type    Brenda Hernandez, PT Physical Therapist                Recommendation and Plan   PT Assessment/Plan     Row Name 01/05/22 0800          PT Assessment    Functional Limitations Impaired gait; Limitation in home " management; Limitations in community activities; Performance in leisure activities; Performance in self-care ADL; Performance in work activities; Performance in sport activities  wound mgmt  -     Impairments Gait; Integumentary integrity; Pain  -     Assessment Comments Pt has met all his STGs for PT wound care and is progressing toward his final remaining goal. The perianal wound is relatively unchanged, but the location prevents use of most conventional, advanced dressings other than kyle Ag collagen. After light debridement the area is clean, moist, and red. The pinpoint buttock abscess is still draining creamy/tan drainage with very mild odor. If no/limited progress by next appt, pt may benefit from a wound culture to determine bacterial load, if any. Pt will continue to benefit from skilled PT wound care to promote healing. Given his progress and independence with home wound dressing changes, pt is appropriate to decrease frequency to once every 2 weeks.  -     Rehab Potential Good  -     Patient/caregiver participated in establishment of treatment plan and goals Yes  -     Patient would benefit from skilled therapy intervention Yes  -            PT Plan    PT Frequency 1x/week; Other (comment)  every 2 weeks  -     Predicted Duration of Therapy Intervention (PT) 4 visits  -     Planned CPT's? PT NONSELECT DEBRIDE 15 MIN: 76170; PT SELF CARE/MGMT/TRAIN 15 MIN: 75278; PT MCKENZIE DEBRIDE OPEN WOUND UP TO 20 CM: 03384; PT THER SUPP EA 15 MIN  -     Physical Therapy Interventions (Optional Details) wound care; patient/family education  -     PT Plan Comments debridement, dressings, culture if no/limited progress  -           User Key  (r) = Recorded By, (t) = Taken By, (c) = Cosigned By    Initials Name Provider Type    Brenda Hernandez PT Physical Therapist                Goals   PT OP Goals     Row Name 01/05/22 0800          PT Short Term Goals    STG Date to Achieve 01/02/22  -      STG 1 Reduce wound dimensions by 25% as evidence of wound healing.  -     STG 1 Progress Met  -     STG 2 Pt to report less than 3/10 pain with daily activities to promote return to PLOF.  -     STG 2 Progress Met  -            Long Term Goals    LTG Date to Achieve 02/02/22  -     LTG 1 Patient/spouse independent with home dressing changes.  -     LTG 1 Progress Met  -     LTG 2 Reduce wound dimensions by 75% as evidence of wound healing.  -     LTG 2 Progress Ongoing  -            Time Calculation    PT Goal Re-Cert Due Date 02/02/22  -           User Key  (r) = Recorded By, (t) = Taken By, (c) = Cosigned By    Initials Name Provider Type    Brenda Hernandez, PT Physical Therapist                PT Goal Re-Cert Due Date: 02/02/22  PT Short Term Goals  STG Date to Achieve: 01/02/22  STG 1: Reduce wound dimensions by 25% as evidence of wound healing.  STG 1 Progress: Met  STG 2: Pt to report less than 3/10 pain with daily activities to promote return to PLOF.  STG 2 Progress: Met  Long Term Goals  LTG Date to Achieve: 02/02/22  LTG 1: Patient/spouse independent with home dressing changes.  LTG 1 Progress: Met  LTG 2: Reduce wound dimensions by 75% as evidence of wound healing.  LTG 2 Progress: Ongoing      Time Calculation: Start Time: 0800  Untimed Charges  74102-Uaaexlbyh debridement: 15  Total Minutes  Untimed Charges Total Minutes: 15   Total Minutes: 15  Therapy Charges for Today     Code Description Service Date Service Provider Modifiers Qty    [id number]  MCKENZIE DEBRIDE OPEN WOUND UP TO 20CM 1/5/2022 Brenda Alatorre, PT GP 1                  Brenda Alatorre, PT  1/5/2022

## 2022-01-19 ENCOUNTER — HOSPITAL ENCOUNTER (OUTPATIENT)
Dept: PHYSICAL THERAPY | Facility: HOSPITAL | Age: 42
Setting detail: THERAPIES SERIES
Discharge: HOME | End: 2022-01-19

## 2022-01-19 DIAGNOSIS — K61.0 PERIANAL ABSCESS: Primary | ICD-10-CM

## 2022-01-19 PROCEDURE — 97597 DBRDMT OPN WND 1ST 20 CM/<: CPT
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
## 2022-02-01 PROCEDURE — U0004 COV-19 TEST NON-CDC HGH THRU: HCPCS | Performed by: NURSE PRACTITIONER

## 2022-02-04 ENCOUNTER — APPOINTMENT (OUTPATIENT)
Dept: PHYSICAL THERAPY | Facility: HOSPITAL | Age: 42
End: 2022-02-04

## 2022-02-16 ENCOUNTER — HOSPITAL ENCOUNTER (OUTPATIENT)
Dept: PHYSICAL THERAPY | Facility: HOSPITAL | Age: 42
Setting detail: THERAPIES SERIES
Discharge: HOME | End: 2022-02-16

## 2022-02-16 DIAGNOSIS — K61.0 PERIANAL ABSCESS: Primary | ICD-10-CM

## 2022-02-16 PROCEDURE — 97602 WOUND(S) CARE NON-SELECTIVE: CPT

## 2022-02-16 NOTE — THERAPY PROGRESS REPORT/RE-CERT
Outpatient Rehabilitation - Wound/Debridement Progress Note  [facility]     Patient Name: Emiliano Holliday  : 1980  MRN: [id number]  Today's Date: 2022                 Admit Date: 2022    Visit Dx:    ICD-10-CM ICD-9-CM   1. Perianal abscess  K61.0 566       There is no problem list on file for this patient.       No past medical history on file.     No past surgical history on file.      EVALUATION   PT Ortho     Row Name 22 3630       Subjective Comments    Subjective Comments Pt states he sees dermatology this week.  Reports he had a follow-up with Dr. Ramirez and states he was told these wounds just take time to heal, to continue with wound care.  Pt reports continued creamy pus-like drainage from L buttock area.  Reports pain and drainage limit returning to normal active lifestyle.  -       Subjective Pain    Able to rate subjective pain? yes  -    Pre-Treatment Pain Level 2  -    Post-Treatment Pain Level 2  -    Subjective Pain Comment tender L buttock induration  -JM       Transfers    Sit-Stand [place] (Transfers) independent  -    Stand-Sit [place] (Transfers) independent  -    Comment (Transfers) LSL for tx  -       Gait/Stairs (Locomotion)    [place] Level (Gait) independent  -          User Key  (r) = Recorded By, (t) = Taken By, (c) = Cosigned By    Initials Name Provider Type    Nicolasa Schulz, PT Physical Therapist                 [facility] Wound     Row Name 22 1350             Wound 21 0845 Left perineum Abcess    Wound - Properties Group Placement Date: 21  - Placement Time: 845  - Side: Left  - Location: perineum  - Primary Wound Type: Abcess  -      Wound Image  View All Images View Images  -      Dressing Appearance intact; moist drainage  -      Base moist; pink; red; epithelialization  fissure in perianal area remains  -      Periwound intact; indurated; warm; pustules  approx 4cm x2cm area of firm  induration with draining pustules  -      Periwound Temperature warm  -      Periwound Skin Turgor firm  -      Edges open; irregular  -JM      Wound Length (cm) 2 cm  -JM      Wound Width (cm) 1 cm  -      Wound Depth (cm) 0.5 cm  -      Drainage Characteristics/Odor yellow; creamy; tan  -JM      Drainage Amount small  -JM      Care, Wound cleansed with; wound cleanser; debrided  -JM      Dressing Care dressing applied; antimicrobial agent applied; silver impregnated; other (see comments)  interdry ag  -JM      Periwound Care barrier ointment applied; cleansed with pH balanced cleanser; dry periwound area maintained  zguard/antifungal mix  -JM      Retired Wound - Properties Group Date first assessed: 11/04/21  - Time first assessed: 0845  - Side: Left  - Location: perineum  - Primary Wound Type: Abcess  -            User Key  (r) = Recorded By, (t) = Taken By, (c) = Cosigned By    Initials Name Provider Type    Nicolasa Schulz, PT Physical Therapist                  WOUND DEBRIDEMENT  Total area of Debridement: nonselective  Debridement Site 1  Location- Site 1: L buttock/perianal abscess  Selective Debridement- Site 1: Wound Surface <20cmsq  Instruments- Site 1: other (comment) (gauze, cotton swab)  Excised Tissue Description- Site 1: scant, slough, other (comment) (ceramy exudate)  Bleeding- Site 1: none              Therapy Education     Row Name 02/16/22 0267             Therapy Education    Education Details Continue with antifungal/zguard mix to area with interdry ag, change PRN for drainage.  Pt to call for appt PRN after dermatology appt.  -JM      Given Symptoms/condition management; Bandaging/dressing change; Pain management  -      Program Reinforced  -MO      How Provided Verbal; Demonstration  -      Provided to Patient  -      Level of Understanding Verbalized  -            User Key  (r) = Recorded By, (t) = Taken By, (c) = Cosigned By    Initials Name Provider Type     Nicolasa Schulz, PT Physical Therapist                Recommendation and Plan   PT Assessment/Plan     Row Name 02/16/22 1350          PT Assessment    Functional Limitations Impaired gait; Limitation in home management; Limitations in community activities; Performance in leisure activities; Performance in self-care ADL; Performance in work activities; Performance in sport activities  wound mgmt  -     Impairments Gait; Integumentary integrity; Pain  -     Assessment Comments Perianal fissure measurements stable, still moist/red with scant slough buildup.  L buttock area with increased induration and now with two draining pustules med/lat to induration, area tender to palpation.  Pt with limited PT wound care needs at this time, is independent with home dressings.  Management of induration and pustule formation deferred to pt's surgeon and dermatology.  PT recommended follow-up PRN pending dermatology consultation or further MD recommendations.  -MO     Rehab Potential Fair  -     Patient/caregiver participated in establishment of treatment plan and goals Yes  -     Patient would benefit from skilled therapy intervention Yes  -            PT Plan    PT Frequency Other (comment)  1-2x/month pending MD recommendations  -MO     Predicted Duration of Therapy Intervention (PT) 4 visits  -MO     Planned CPT's? PT NONSELECT DEBRIDE 15 MIN: 18263; PT MCKENZIE DEBRIDE OPEN WOUND UP TO 20 CM: 11811; PT SELF CARE/MGMT/TRAIN 15 MIN: 54556  -MO     Physical Therapy Interventions (Optional Details) patient/family education; wound care  -     PT Plan Comments dressings management, debridement PRN  -           User Key  (r) = Recorded By, (t) = Taken By, (c) = Cosigned By    Initials Name Provider Type    Nicolasa Schulz, PT Physical Therapist                Goals   PT OP Goals     Row Name 02/16/22 1350          PT Short Term Goals    STG Date to Achieve 04/17/22  -     STG 1 Reduce wound dimensions by  25% as evidence of wound healing.  -     STG 1 Progress Met  -     STG 2 Pt to report less than 3/10 pain with daily activities to promote return to PLOF.  -     STG 2 Progress Met  -            Long Term Goals    LTG Date to Achieve 05/17/22  -     LTG 1 Patient/spouse independent with home dressing changes.  -     LTG 1 Progress Met  -     LTG 2 Reduce wound dimensions by 75% as evidence of wound healing.  -     LTG 2 Progress Ongoing  -            Time Calculation    PT Goal Re-Cert Due Date 05/17/22  -           User Key  (r) = Recorded By, (t) = Taken By, (c) = Cosigned By    Initials Name Provider Type    Nicolasa Schulz, PT Physical Therapist                PT Goal Re-Cert Due Date: 05/17/22  PT Short Term Goals  STG Date to Achieve: 04/17/22  STG 1: Reduce wound dimensions by 25% as evidence of wound healing.  STG 1 Progress: Met  STG 2: Pt to report less than 3/10 pain with daily activities to promote return to PLOF.  STG 2 Progress: Met  Long Term Goals  LTG Date to Achieve: 05/17/22  LTG 1: Patient/spouse independent with home dressing changes.  LTG 1 Progress: Met  LTG 2: Reduce wound dimensions by 75% as evidence of wound healing.  LTG 2 Progress: Ongoing      Time Calculation: Start Time: 1350  Untimed Charges  97602-Non-selective debridement: 15  Total Minutes  Untimed Charges Total Minutes: 15   Total Minutes: 15  Therapy Charges for Today     Code Description Service Date Service Provider Modifiers Qty    83695171395 HC NONSELECTIVE DEBRIDEMENT 2/16/2022 Nicolasa Corrales, PT GP 1                  Nicolasa Corrales, PT  2/16/2022

## 2022-04-04 ENCOUNTER — DOCUMENTATION (OUTPATIENT)
Dept: PHYSICAL THERAPY | Facility: HOSPITAL | Age: 42
End: 2022-04-04

## 2022-04-04 DIAGNOSIS — K61.0 PERIANAL ABSCESS: Primary | ICD-10-CM

## 2022-04-04 NOTE — THERAPY DISCHARGE NOTE
Outpatient Rehabilitation - Wound/Debridement   Discharge Summary       Patient Name: Emiliano Holliday  : 1980  MRN: [id number]  Today's Date: 2022                  Admit Date: (Not on file)    Visit Dx:    ICD-10-CM ICD-9-CM   1. Perianal abscess  K61.0 566                      Goals   PT OP Goals     Row Name 22 1008          PT Short Term Goals    STG Date to Achieve 22  -MW     STG 1 Reduce wound dimensions by 25% as evidence of wound healing.  -MW     STG 1 Progress Met  -MW     STG 2 Pt to report less than 3/10 pain with daily activities to promote return to PLOF.  -MW     STG 2 Progress Met  -MW            Long Term Goals    LTG Date to Achieve 22  -MW     LTG 1 Patient/spouse independent with home dressing changes.  -MW     LTG 1 Progress Met  -MW     LTG 2 Reduce wound dimensions by 75% as evidence of wound healing.  -MW     LTG 2 Progress Ongoing  -MW           User Key  (r) = Recorded By, (t) = Taken By, (c) = Cosigned By    Initials Name Provider Type    Laine Cheatham, PT Physical Therapist                       OP Discharge Summary     Row Name 22 1008             OP PT Discharge Summary    Date of Discharge 22  -MW      Reason for Discharge Maximum functional potential achieved  -MW      Outcomes Achieved Patient able to partially acheive established goals  -MW      Discharge Destination Home with caregiver assist;Other (comment)  -MW      Discharge Instructions/Additional Comments Cont wound management at home with caregiver assist. Follow up with PCP, dermatology for additional pustule care. D/c PT wound care.  -MW            User Key  (r) = Recorded By, (t) = Taken By, (c) = Cosigned By    Initials Name Provider Type    Laine Cheatham, PT Physical Therapist                Laine Deleon, [facility]  2022